# Patient Record
(demographics unavailable — no encounter records)

---

## 2017-12-28 NOTE — CT
CT OF ABDOMEN AND PELVIS PERFORMED WITHOUT CONTRAST ENHANCEMENT:

12/28/17

 

HISTORY: 

Left flank pain. 

 

COMPARISON:  

4/28/17 study.

 

Lung bases are clear of infiltrative process. 

 

The liver, spleen, pancreas, and gallbladder regions are unremarkable. 

 

Right and left adrenal glands are normal. Right and left kidneys are normal in size. There is some pu
nctate right renal calculi with some mild right sided hydronephrosis related to an approximately 6 mm
 calculus which is located at the S1 vertebral body level. This was present on the prior examination.
 The degree of hydronephrosis is less pronounced. 

 

On the left side, there is left sided hydronephrosis and hydroureter related to a proximal left urete
ral calculus located just at the ureteropelvic junction level. It measures 8 to 9 mm. There are multi
ple lower pole left renal calculi similar in size and other tiny punctate renal calculi. There is no 
significant periaortic or mesenteric adenopathy.

 

CT OF PELVIS PERFORMED WITHOUT CONTRAST ENHANCEMENT:

No adenopathy, mass or free fluid. 

 

IMPRESSION:  

1.      Bilateral renal calculi.

2.      Bilateral ureteral calculi with a 6 mm calculus located at the level of S1 with associated mi
ld hydronephrosis. There is moderate left sided hydronephrosis related to a stone located approximate
ly at the ureteropelvic junction level. 

 

POS: XU

## 2017-12-29 NOTE — RAD
RETROGRADE IVP:

12/29/17

 

INDICATION:

Stent. 

 

COMPARISON:  

Prior CT evaluation dated 12/28/17. 

 

FINDINGS:   

Submitted fluoroscopic images demonstrate retrograde opacification of the left ureter with the proxim
al ureteral stone identified on the prior CT seen within the proximal left ureter. Subsequent images 
demonstrate placement of a left ureteral stent. The stent projects in the expected position. The prox
imal left ureteral stone and left nephrolithiasis is unchanged in position.

 

IMPRESSION:  

IVP as above. 

 

POS: XU

## 2017-12-29 NOTE — OP
DATE OF SEDRVICE:  12/29/2017

 

PREOPERATIVE DIAGNOSES:

1.  Left hydronephrosis due to 10 mm proximal ureteral calculi, left nonobstructing lower pole renal 
stone approximately 1.6-2 cm.

2.  Nonobstructing right mid ureteral calculi at the level of L5, asymptomatic.

 

POSTOPERATIVE DIAGNOSES:

1.  Left hydronephrosis due to 10 mm proximal ureteral calculi, left nonobstructing lower pole renal 
stone approximately 1.6-2 cm.

2.  Nonobstructing right mid ureteral calculi at the level of L5, asymptomatic

 

PROCEDURE:  Cystoscopy, left retrograde, 6 x 24 double-J ureteral stent.

 

SURGEON:  Teresa Treviño D.O.

 

ANESTHESIA:  LMA.

 

COMPLICATIONS:  None apparent.

 

SPECIMEN:  None.

 

DISPOSITION:  To recovery room in stable condition.

 

INTRAOPERATIVE FINDINGS:  High-grade obstruction of the left kidney due to large left proximal ureter
al stone.

 

INDICATIONS FOR PROCEDURE AND HISTORY:  Ms. Dunne is a 39-year-old female with history of morbid o
besity, primary hyperparathyroidism with recurrent kidney stones.  Patient followed by Dr. Alcazar, and
 the patient was admitted with urologic consultation admitted for left flank pain due to high grade h
ydronephrosis due to large proximal ureteral calculi.  She has a contralateral nonobstructing renal u
reteral calculi, she does not desire ureteral stent as she has no significant discomfort.  She desire
s to proceed with left ureteral stent due to significant discomfort.  Risks and complications and ind
ications of the procedure reviewed with her in detail including, but not limited to, bleeding, pain, 
infection, injury to adjacent organs, urosepsis, injury to kidney, ureter, bladder reviewed.  All que
stions were answered to her satisfaction, and she desired to proceed.  As patient does have a history
 of retained ureteral stent with encrustation, importance of follow up with her primary urologist sim
ple discussed with patient in detail.  She verbalizes understanding has an appointment with Dr. Alcazar
 standing 01/11/2017.

 

DESCRIPTION OF THE PROCEDURE:  After an informed consent is signed, the patient is taken to the opera
ting room, placed in a dorsal lithotomy position with the genital area prepped and draped in the usua
l surgical sterile fashion.  Preoperative fluoroscopy KUB demonstrates a large left proximal ureteral
 stone, right ureteral stone was unable to be visualized.  Bilateral HARIS hose, SCDs were placed.  Bro
ad spectrum antibiotics provided.  A 21 Romansh cystoscope was utilized for cystoscopy which demonstra
haris unremarkable bladder.  The ureteral orifices identified in normal anatomical location.  A 5 Frenc
h open-ended catheter was utilized for retrograde pyelogram of the left ureter, which demonstrated hi
gh-grade obstruction in which I had difficulty opacifying contrast proximal to a large proximal urete
ral stone.  I was able to pass the open-ended catheter to the level of the stone with manipulation of
 a 0.35 Glidewire, we were able to negotiate the wire up to the upper pole.  Subsequently, we passed 
a 6 x 24 double-J ureteral stent.  She tolerated the procedure well.  Transported to the [facility] in stable condition.  The patient will be observed.  She will most likely be observed overnight giv
en surgical intervention later in the afternoon.  Anticipation may be discharged in the following mor
heike.  Followup appointment with Dr. Alcazar as in chart.  Prescription for Norco, ciprofloxacin empiri
katharine Flomax, [facility] is in chart.

## 2017-12-29 NOTE — HP
DATE OF ADMISSION:  2017

 

CHIEF COMPLAINT:  Abdominal pain.

 

HISTORY OF PRESENT ILLNESS:  This is a 39-year-old young white female with a known history of renal s
tones in the past.  The patient was in her usual state of health until last Saturday when she started
 noticing acute onset of left CVA pain, left costovertebral angle pain associated with severe nausea 
and vomiting.  The patient did not have any fevers, but she did mention that she was having chills an
d rigors.  Abdominal pain, she rates as 9/10 in intensity and not relieved with any Tylenol.  The pat
celeste has a significant history of renal stones.  She had renal stent placed more than a year ago on t
he left side and on the left side ureteral stent was placed and it was completely calcified in March 
of this year and was removed along with lithotripsy.  Due to her heavy calcium deposition, she was re
ferred to urologist, Dr. Alcazar, who has been following her since her last stent placement.  The patie
nt does found to have hyperparathyroidism with elevated PTH and calcium, last month, in November, she
 had a parathyroidectomy.  The patient has been closely followed by him.  The patient otherwise do no
t have any primary care physician and she does not follow up with anybody.  She has no other known me
dical problems except for the kidney stones problem.  She denies having any chest pain.  She does hav
e nausea and vomiting.  No diarrhea.  No constipation.  Denies having any headache.  No dizziness.

 

PAST MEDICAL HISTORY:

1.  Recurrent renal stones.

2.  Hyperparathyroidism.

3.  Hypercalcemia.

 

PAST SURGICAL HISTORY:

1.   in the past.

2.  The patient had empyema with left lung thoracotomy done 3 years ago.

3.  History of ureteric stent placement 2 years ago and stent removal in 2017.

4.  History of parathyroidectomy in 2017.

 

FAMILY HISTORY:  The patient has significant family history with pancreatic cancer in her dad, who di
ed at the age of 63 and has a significant renal stone history in all the family members.

 

SOCIAL HISTORY:  The patient is a nonsmoker.  She smokes electric cigarettes with the high nicotine c
ontent.  She does drink alcohol occasionally, beers, and she does not do any marijuana or any other i
llicit drugs.

 

REVIEW OF SYSTEMS:  All 12 systems are reviewed with the patient thoroughly and found to be negative 
at this time.  Systems reviewed are HEENT, CVS, CNS, respiratory, GI, , musculoskeletal, skin integ
umentary, psychiatric.

The following complete review of systems was negative, unless otherwise mentioned in the HPI or below
:

CONSTITUTIONAL:  Weight loss or gain, sense of well-being, ability to conduct usual activities, exerc
ise tolerance.

SKIN/BREAST:  Rash, itching, changes in hair growth or loss, nail changes, breast lumps, tenderness, 
swelling, nipple discharge.

EYES:  Vision, double vision, tearing, blind spots, pain.

ENT/MOUTH:  Headaches (location, time of onset, duration, precipitating

factors), vertigo, lightheadedness, injury. Vision, double vision, tearing, blind spots, pain, nose b
leeding, colds, obstruction, discharge, dental difficulties, gingival bleeding, dentures, neck stiffn
ess, pain, tenderness, masses in thyroid or other areas

CARDIOVASCULAR:  Precordial pain, substernal distress, palpitations, syncope, dyspnea on exertion, or
thopnea, nocturnal paroxysmal dyspnea, edema, cyanosis, hypertension, heart murmurs, varicosities, ph
lebitis, claudication.

RESPIRATORY:  Pain, shortness of breath, wheezing, stridor, cough, hemoptysis, fever or night sweats

GASTROINTESTINAL:  Poor appetite, dysphagia, indigestion, abdominal pain, heartburn, eructation, naus
ea, vomiting, hematemesis, jaundice, constipation, or diarrhea, abnormal stools (christie-colored, tarry,
 bloody, greasy, foul smelling), flatulence, hemorrhoids, recent changes in bowel habits.

GENITOURINARY:  Urgency, frequency, dysuria, nocturia, hematuria, polyuria, oliguria, unusual (or kris
nge in) color of urine, stones, hesitancy, change in size of stream, dribbling, acute retention or in
continence, libido, potency.

MUSCULOSKELETAL:  Pain, swelling, redness or heat of muscles or joints,

limitation, of motion, muscular weakness, atrophy, cramps.

NEUROLOGIC/PSYCHIATRIC:  Convulsions, paralyses, tremor, incoordination, parasthesias, difficulties w
ith memory of speech, sensory or motor disturbances, or muscular coordination (ataxia, tremor), emoti
onal problems, anxiety, depression, previous psychiatric care, unusual perceptions, hallucinations.

ALLERGY/IMMUNOLOGIC:  Skin rash, anemia, bleeding tendency, polydipsia,

polyuria, intolerance to heat or cold.

 

HOME MEDICATIONS:  None.

 

ALLERGIES:  No known drug allergies.

 

PHYSICAL EXAMINATION:

VITAL SIGNS:  Blood pressures are 133/90, heart rate is 80, respirations 20, saturation 98%.

GENERAL:  The patient is moderately built and moderately nourished, does not appear to be in acute di
stress.

CARDIOVASCULAR:  S1, S2 normal.  No murmurs, rubs or gallops.

LUNGS:  Bilateral air entry was equal.  No wheezing, no crackles.

ABDOMEN:  Soft, nontender.  No guarding.  No rebound tenderness.  Bowel sounds normal.  The patient h
as a severe CVA tenderness on the left side, but no CVA tenderness on the right side.  No suprapubic 
tenderness was noted.

MUSCULOSKELETAL:  No calf tenderness.  No pedal edema.  No joint tenderness.  No joint swelling.

SKIN:  No cyanosis, no erythema, no rash, no pallor.

NEUROLOGIC:  Cranial nerve examination II-XII intact.  No focal deficits were noted.

LYMPHADENOPATHY:  No evidence of any lymphadenopathy was noted.

NECK:  No JVD.  No thyromegaly.  The scar is noted for parathyroidectomy.

 

LABORATORY DATA:  WBC 14.2, hemoglobin is 13.2, hematocrit is 40.7, platelets are 365.

Sodium 138, potassium 4.0, chloride 101, bicarbonate is 28, BUN is 11, creatinine is 1.06.

 

UA was positive for urinary tract infection, also had a moderate amount of hematuria.

 

ASSESSMENT AND PLAN:

1.  Sepsis.

2.  Obstructive uropathy.

3.  Moderate dehydration.

 

PLAN:

1.  Plan is to start the patient on IV fluids at this time with 100 mL an hour.  We will keep the pat
ient n.p.o., and we will consult Urology, Dr. Alcazar, who had seen this patient in the past.  The balaji
ent is septic with the flank urinary tract infection and with possibly obstructing ureteric stone.  W
e will start the patient on cefepime at 1 gram IV q.8 hours, which should cover Pseudomonas and all o
ther gram-negative infections.

2.  The patient has evidence of mild to moderate hydronephrosis.  Renal functions remained stable at 
this time.  We will closely monitor the kidney functions and avoid any nephrotoxic medications.

3.  The patient has severe pain, uncontrolled with morphine.  We will do Toradol sparingly to avoid a
ny nephrotoxicity.

4.  Moderate dehydration.  We will continue the patient on IV fluids at this time with a normal salin
e 100 mL hour.

5.  Deep venous thrombosis prophylaxis, SCDs.

 

I spent 70 minutes with this patient.

## 2017-12-29 NOTE — CON
DATE OF CONSULTATION:  2017

 

INPATIENT CONSULTATION

 

Coverage for Dr. Tobi Alcazar

 

REFERRING:  Hospitalist.

 

REASON FOR CONSULT:  Bilateral ureteral calculi.

 

HISTORY OF PRESENT ILLNESS:  Ms. Dunne is a 39-year-old female with morbid 
obesity with history of recurrent urolithiasis, history of primary 
hyperparathyroidism.  The patient does have significant urologic history, in 
which she underwent left PCNL, bladder stone treatment with laser lithotripsy 
due to history of retained ureteral stent.  She was recently seen by Dr. Alcazar 
on 2017 and offered CT of the abdomen and pelvis as she had vague right 
flank pain, she declined.  She presents to [facility] due to 
severe left flank pain.  Pain was rated 8-10 on the pain scale, currently on IV 
morphine.  She denies history of fever, has had intermittent nausea.  She is 
currently afebrile, with leukocytosis of 14 with no significant shift.  Renal 
function is stable at 1.06.  Calcium within normal limits at 9.7.  Her 
urinalysis does demonstrate hematuria, pyuria; however, it is a contaminated 
specimen.  Moderate leukocytes and negative nitrites noted.  Currently, she 
requires morphine every 4 hours,  resting comfortably with some discomfort that 
has recurred in the left lower back.  She denies right flank pain.

 

PAST MEDICAL HISTORY:

1.  Recurrent kidney stones, hyperparathyroidism with history of hypercalcemia.

2.  History of retained ureteral stent resulting in encrusted ureteral stent, 
bladder stone.

 

PAST SURGICAL HISTORY:

1.  , thoracotomy due to empyema.

2.  History of parathyroidectomy in 2017 at [facility].

3.  History of left PCNL, cystolitholapaxy by Dr. Alcazar 2017.

4.  Removal of nephrostomy tube, ureteral stent.

 

FAMILY HISTORY:  Positive for pancreatic cancer.

 

SOCIAL HISTORY:  History of tobacco abuse, currently quit.  Denies illicit drug 
use.

 

REVIEW OF SYSTEMS:  Ten point review of systems as above, otherwise 
noncontributory.

 

ALLERGIES:  No known drug allergies.

 

PHYSICAL EXAMINATION:

GENERAL:  Currently, patient is resting comfortably.

VITAL SIGNS:  Stable, 97.8, 84, 96, 124/96.

GENERAL:  Alert and oriented x3.

HEENT:  Unremarkable.

HEART:  Regular rate.

LUNGS:  Clear.

ABDOMEN:  Obese, protuberant, mild left flank lower quadrant abdominal 
discomfort on palpation with no rigidity, no rebound.  There is no evidence of 
right flank tenderness.

GENITOURINARY:  Grossly unremarkable.

EXTREMITIES:  No cyanosis, clubbing or edema.

NEUROLOGIC:  No gross focal deficits.

 

PERTINENT LABORATORY DATA AND IMAGIN.  White count 14, hemoglobin 13, platelet 265, creatinine 1.06, calcium 9.7. 
  Urinalysis contaminated with 4-6 squamous epithelials, negative nitrites, 
moderate leukocytes, 11-20 RBCs, 21-50 WBCs.

2.  CT of the abdomen and pelvis stone protocol.  I did review the images 
myself.  There is a right punctate upper pole renal stone.   mild right 
hydronephrosis secondary to L5-S1, 5-6 mm stone.  Left kidney has lower pole 
stone x2, combination of the two stone moiety measures approximately 1.6-2 cm.  
Left proximal L3 ureteral stone measuring approximately 9-10 mm.  Hounsfield 
unit is over 1000, left severe hydronephrosis.

 

IMPRESSION AND PLAN:

1.  Ms. Dunne is a 39-year-old morbidly obese female with history of 
hyperparathyroidism.

2.  Hypercalcemia, resolved.

3.  History of recurrent urolithiasis.

4.  History of left retained ureteral stent resulting in encrusted stent, 
bladder stone, status post laser lithotripsy PCNL resolved.

5.  Current admission due to asymptomatic right midureteral calculi, 
symptomatic left flank pain due to large proximal ureteral calculi with severe 
hydronephrosis.  I discussed with patient regarding cystoscopy, left retrograde 
stent which she desires to proceed.  The patient is in full understanding 
regarding close followup with her urologist due to her history of retained 
ureteral stent.  She verbalizes understanding and states that she will follow 
up in a timely manner.  I offered the patient a right retrograde stent as well, 
however options of observation for medical expulsion therapy of the right side 
discussed as stone is 5-6 mm with mild hydronephrosis.  As she has no symptoms,
  does not desire right ureteral stent.  She does agree to proceed with left 
ureteral stent.  The patient is currently n.p.o., broad spectrum antibiotics 
have been initiated currently.  Recheck UA C&S catheterized specimen has been 
ordered.  I anticipate the patient should be able to be discharged home after 
ureteral stent if pain is adequately controlled.  Appointment has been setup 
with Dr. Alcazar 2018 at 10:00 a.m. to follow up urine culture results.

 

YASMINE

## 2017-12-30 NOTE — DIS
DATE OF ADMISSION:  12/29/2017

 

DATE OF DISCHARGE:  12/30/2017

 

ADMITTING DIAGNOSIS:  Sepsis.

 

DISCHARGE DIAGNOSIS:  Sepsis from obstructive uropathy.

 

SECONDARY DIAGNOSES:

1.  Obstructive uropathy with bilateral renal stones, the largest being 6 mm.

2.  Bilateral mild-to-moderate hydronephrosis.

3.  Hyperparathyroidism.

4.  Hyperglycemia.

 

CONSULTANTS INVOLVED IN HIS CARE:  Dr. Teresa Treviño.

 

HISTORY OF PRESENT ILLNESS AND HOSPITAL COURSE:  In brief, this is a 39-year-old young white female w
ith a known history of renal stones in the past, and the patient presented with severe left costovert
ebral angle pain associated with severe nausea and vomiting.  The patient was noted to have an elevat
ed white count of 13,000 and urinalysis was positive and urine cultures still pending.  The patient w
as started on IV antibiotics with cefepime 1 g t.i.d., and her white count did come back to normal.  
The patient was seen by Dr. Treviño, one of the urologists covering for Dr. Alcazar.  The patient h
ad successful stent placement and her pain was relieved.  The patient was kept overnight for monitori
ng because of the elevated white count and she was on IV antibiotics.  The following morning, the pat
ient was doing fine.  She has no pain problems.  Urology suggested the patient could be discharged an
d followed up with primary urologist.

 

The patient is discharged home in stable condition and advised to continue on the oral antibiotic at 
least to complete the course for 7 days.

 

PHYSICAL EXAMINATION:

VITAL SIGNS:  On the day of discharge, blood pressures are 109/65, heart rate is 72, respiratory rate
 20, saturation is 96%, temperature 97.8.

GENERAL:  The patient is moderately built and moderately nourished, does not appear in acute distress
.

CARDIOVASCULAR:  S1, S2 normal.  No murmurs, rubs or gallops.

LUNGS:  Bilateral air entry was equal.  No wheezing, no crackles.

ABDOMEN:  Soft and nontender.  No guarding, no rebound tenderness.  There is still mild tenderness wa
s noted in the left CVA, but otherwise much improved from yesterday.

 

DISCHARGE INSTRUCTIONS:

1.  Continue activity as tolerated.  Advised to follow up with Dr. Alcazar in 1-2 weeks or as suggested
 by the Urology.  I advised the patient to increase fluid intake.  I advised the patient to avoid jonana
ing calcium tablets.

2.  The patient is advised to return to the ER for any worsening pain or any worsening fever.  I advi
sed to continue the antibiotics and complete the course.

 

I spent 35 minutes with this patient on the day of discharge.